# Patient Record
Sex: MALE
[De-identification: names, ages, dates, MRNs, and addresses within clinical notes are randomized per-mention and may not be internally consistent; named-entity substitution may affect disease eponyms.]

---

## 2018-09-12 ENCOUNTER — HOSPITAL (OUTPATIENT)
Dept: OTHER | Age: 48
End: 2018-09-12
Attending: GENERAL PRACTICE

## 2023-05-04 ENCOUNTER — OFFICE VISIT (OUTPATIENT)
Dept: INTERNAL MEDICINE CLINIC | Facility: CLINIC | Age: 53
End: 2023-05-04
Payer: COMMERCIAL

## 2023-05-04 ENCOUNTER — LAB ENCOUNTER (OUTPATIENT)
Dept: LAB | Age: 53
End: 2023-05-04
Attending: INTERNAL MEDICINE
Payer: COMMERCIAL

## 2023-05-04 VITALS
HEIGHT: 71.22 IN | TEMPERATURE: 97 F | DIASTOLIC BLOOD PRESSURE: 60 MMHG | OXYGEN SATURATION: 96 % | WEIGHT: 222.81 LBS | BODY MASS INDEX: 30.85 KG/M2 | SYSTOLIC BLOOD PRESSURE: 102 MMHG | HEART RATE: 96 BPM

## 2023-05-04 DIAGNOSIS — Z12.11 COLON CANCER SCREENING: ICD-10-CM

## 2023-05-04 DIAGNOSIS — Z00.00 ANNUAL PHYSICAL EXAM: ICD-10-CM

## 2023-05-04 DIAGNOSIS — L40.50 ARTHROPATHIC PSORIASIS (HCC): ICD-10-CM

## 2023-05-04 DIAGNOSIS — Z23 IMMUNIZATION DUE: ICD-10-CM

## 2023-05-04 DIAGNOSIS — L40.9 PSORIASIS OF SCALP: ICD-10-CM

## 2023-05-04 DIAGNOSIS — Z80.42 FAMILY HISTORY OF PROSTATE CANCER IN FATHER: ICD-10-CM

## 2023-05-04 DIAGNOSIS — Z00.00 ANNUAL PHYSICAL EXAM: Primary | ICD-10-CM

## 2023-05-04 LAB
ALBUMIN SERPL-MCNC: 4 G/DL (ref 3.4–5)
ALBUMIN/GLOB SERPL: 1.1 {RATIO} (ref 1–2)
ALP LIVER SERPL-CCNC: 111 U/L
ALT SERPL-CCNC: 48 U/L
ANION GAP SERPL CALC-SCNC: 1 MMOL/L (ref 0–18)
AST SERPL-CCNC: 25 U/L (ref 15–37)
BASOPHILS # BLD AUTO: 0.07 X10(3) UL (ref 0–0.2)
BASOPHILS NFR BLD AUTO: 1 %
BILIRUB SERPL-MCNC: 0.8 MG/DL (ref 0.1–2)
BUN BLD-MCNC: 13 MG/DL (ref 7–18)
CALCIUM BLD-MCNC: 9.2 MG/DL (ref 8.5–10.1)
CHLORIDE SERPL-SCNC: 109 MMOL/L (ref 98–112)
CHOLEST SERPL-MCNC: 187 MG/DL (ref ?–200)
CO2 SERPL-SCNC: 30 MMOL/L (ref 21–32)
COMPLEXED PSA SERPL-MCNC: 0.84 NG/ML (ref ?–4)
CREAT BLD-MCNC: 1.25 MG/DL
EOSINOPHIL # BLD AUTO: 0.14 X10(3) UL (ref 0–0.7)
EOSINOPHIL NFR BLD AUTO: 1.9 %
ERYTHROCYTE [DISTWIDTH] IN BLOOD BY AUTOMATED COUNT: 12.3 %
EST. AVERAGE GLUCOSE BLD GHB EST-MCNC: 120 MG/DL (ref 68–126)
FASTING PATIENT LIPID ANSWER: YES
FASTING STATUS PATIENT QL REPORTED: YES
GFR SERPLBLD BASED ON 1.73 SQ M-ARVRAT: 69 ML/MIN/1.73M2 (ref 60–?)
GLOBULIN PLAS-MCNC: 3.8 G/DL (ref 2.8–4.4)
GLUCOSE BLD-MCNC: 92 MG/DL (ref 70–99)
HBA1C MFR BLD: 5.8 % (ref ?–5.7)
HCT VFR BLD AUTO: 51.2 %
HDLC SERPL-MCNC: 41 MG/DL (ref 40–59)
HGB BLD-MCNC: 16.7 G/DL
IMM GRANULOCYTES # BLD AUTO: 0.03 X10(3) UL (ref 0–1)
IMM GRANULOCYTES NFR BLD: 0.4 %
LDLC SERPL CALC-MCNC: 109 MG/DL (ref ?–100)
LYMPHOCYTES # BLD AUTO: 2.01 X10(3) UL (ref 1–4)
LYMPHOCYTES NFR BLD AUTO: 27.5 %
MCH RBC QN AUTO: 29.6 PG (ref 26–34)
MCHC RBC AUTO-ENTMCNC: 32.6 G/DL (ref 31–37)
MCV RBC AUTO: 90.6 FL
MONOCYTES # BLD AUTO: 0.69 X10(3) UL (ref 0.1–1)
MONOCYTES NFR BLD AUTO: 9.4 %
NEUTROPHILS # BLD AUTO: 4.37 X10 (3) UL (ref 1.5–7.7)
NEUTROPHILS # BLD AUTO: 4.37 X10(3) UL (ref 1.5–7.7)
NEUTROPHILS NFR BLD AUTO: 59.8 %
NONHDLC SERPL-MCNC: 146 MG/DL (ref ?–130)
OSMOLALITY SERPL CALC.SUM OF ELEC: 290 MOSM/KG (ref 275–295)
PLATELET # BLD AUTO: 248 10(3)UL (ref 150–450)
POTASSIUM SERPL-SCNC: 4.5 MMOL/L (ref 3.5–5.1)
PROT SERPL-MCNC: 7.8 G/DL (ref 6.4–8.2)
RBC # BLD AUTO: 5.65 X10(6)UL
SODIUM SERPL-SCNC: 140 MMOL/L (ref 136–145)
TRIGL SERPL-MCNC: 212 MG/DL (ref 30–149)
TSI SER-ACNC: 2.39 MIU/ML (ref 0.36–3.74)
VIT D+METAB SERPL-MCNC: 29.5 NG/ML (ref 30–100)
VLDLC SERPL CALC-MCNC: 36 MG/DL (ref 0–30)
WBC # BLD AUTO: 7.3 X10(3) UL (ref 4–11)

## 2023-05-04 PROCEDURE — 90715 TDAP VACCINE 7 YRS/> IM: CPT | Performed by: INTERNAL MEDICINE

## 2023-05-04 PROCEDURE — 90472 IMMUNIZATION ADMIN EACH ADD: CPT | Performed by: INTERNAL MEDICINE

## 2023-05-04 PROCEDURE — 90750 HZV VACC RECOMBINANT IM: CPT | Performed by: INTERNAL MEDICINE

## 2023-05-04 PROCEDURE — 3008F BODY MASS INDEX DOCD: CPT | Performed by: INTERNAL MEDICINE

## 2023-05-04 PROCEDURE — 90471 IMMUNIZATION ADMIN: CPT | Performed by: INTERNAL MEDICINE

## 2023-05-04 PROCEDURE — 80053 COMPREHEN METABOLIC PANEL: CPT

## 2023-05-04 PROCEDURE — 82306 VITAMIN D 25 HYDROXY: CPT

## 2023-05-04 PROCEDURE — 3074F SYST BP LT 130 MM HG: CPT | Performed by: INTERNAL MEDICINE

## 2023-05-04 PROCEDURE — 85025 COMPLETE CBC W/AUTO DIFF WBC: CPT

## 2023-05-04 PROCEDURE — 84443 ASSAY THYROID STIM HORMONE: CPT

## 2023-05-04 PROCEDURE — 80061 LIPID PANEL: CPT

## 2023-05-04 PROCEDURE — 3078F DIAST BP <80 MM HG: CPT | Performed by: INTERNAL MEDICINE

## 2023-05-04 PROCEDURE — 36415 COLL VENOUS BLD VENIPUNCTURE: CPT

## 2023-05-04 PROCEDURE — 83036 HEMOGLOBIN GLYCOSYLATED A1C: CPT

## 2023-05-04 PROCEDURE — 99386 PREV VISIT NEW AGE 40-64: CPT | Performed by: INTERNAL MEDICINE

## 2023-05-04 PROCEDURE — 99203 OFFICE O/P NEW LOW 30 MIN: CPT | Performed by: INTERNAL MEDICINE

## 2023-05-04 RX ORDER — CLOBETASOL PROPIONATE 0.05 G/100ML
1 SHAMPOO TOPICAL 2 TIMES DAILY
Qty: 118 ML | Refills: 0 | Status: SHIPPED | OUTPATIENT
Start: 2023-05-04 | End: 2023-05-18

## 2023-08-10 ENCOUNTER — E-VISIT (OUTPATIENT)
Dept: TELEHEALTH | Age: 53
End: 2023-08-10

## 2023-08-10 DIAGNOSIS — L40.9 PSORIASIS OF SCALP: ICD-10-CM

## 2023-08-10 DIAGNOSIS — H01.001 BLEPHARITIS OF RIGHT UPPER EYELID, UNSPECIFIED TYPE: Primary | ICD-10-CM

## 2023-08-10 PROCEDURE — 99422 OL DIG E/M SVC 11-20 MIN: CPT | Performed by: PHYSICIAN ASSISTANT

## 2023-08-10 RX ORDER — ERYTHROMYCIN 5 MG/G
1 OINTMENT OPHTHALMIC 4 TIMES DAILY
Qty: 3.5 G | Refills: 0 | Status: SHIPPED | OUTPATIENT
Start: 2023-08-10

## 2023-08-10 RX ORDER — CLOBETASOL PROPIONATE 0.46 MG/ML
SOLUTION TOPICAL
Qty: 50 ML | Refills: 0 | Status: SHIPPED | OUTPATIENT
Start: 2023-08-10

## 2023-08-10 NOTE — PROGRESS NOTES
Tiffany Huang is a 46year old male who initiated e-visit care today. HPI:   See answers to questionnaire submission     No current outpatient medications on file. Past Medical History:   Diagnosis Date    Arthritis     Hyperlipidemia       Past Surgical History:   Procedure Laterality Date    COLONOSCOPY      HEMORRHOIDECTOMY      VASECTOMY        Family History   Problem Relation Age of Onset    Cancer Father     Stroke Father     Diabetes Sister       Social History:  Social History     Socioeconomic History    Marital status:    Tobacco Use    Smoking status: Never    Smokeless tobacco: Never   Vaping Use    Vaping Use: Never used   Substance and Sexual Activity    Alcohol use: Never    Drug use: Never   Other Topics Concern    Caffeine Concern No    Exercise No    Special Diet Yes    Stress Concern Yes    Weight Concern Yes         ASSESSMENT AND PLAN:       Diagnoses and all orders for this visit:    Blepharitis of right upper eyelid, unspecified type  -     erythromycin 5 MG/GM Ophthalmic Ointment; Apply 1 Application to eye 4 (four) times daily. Psoriasis of scalp  -     clobetasol 0.05 % External Solution; Apply 1mL to affected area(s) twice daily for 14 days           Duration of  the service:  14 minutes      See [facility] message exchange and Patient Instructions for Comfort Care and patient education.

## 2024-03-08 ENCOUNTER — E-VISIT (OUTPATIENT)
Dept: TELEHEALTH | Age: 54
End: 2024-03-08
Payer: COMMERCIAL

## 2024-03-08 DIAGNOSIS — B34.9 VIRAL SYNDROME: Primary | ICD-10-CM

## 2024-03-08 RX ORDER — GUSELKUMAB 100 MG/ML
INJECTION SUBCUTANEOUS
COMMUNITY
Start: 2023-10-18

## 2024-03-08 NOTE — PROGRESS NOTES
Pelon Whitaker is a 53 year old male submitting e-visit for congestion and cough.  HPI:   See answers to questionnaire and [employer] message exchange  Pt has psoriatic arthritis; received Tremfya  Exposed to son with COVID; pt tested neg for COVID 3/7/24    Current Outpatient Medications   Medication Sig Dispense Refill    TREMFYA 100 MG/ML Subcutaneous Solution Pen-injector Inject 1 pen (100 mg/mL) under the skin every 8 weeks      erythromycin 5 MG/GM Ophthalmic Ointment Apply 1 Application to eye 4 (four) times daily. 3.5 g 0    clobetasol 0.05 % External Solution Apply 1mL to affected area(s) twice daily for 14 days 50 mL 0      Past Medical History:   Diagnosis Date    Arthritis     Hyperlipidemia       Past Surgical History:   Procedure Laterality Date    COLONOSCOPY      HEMORRHOIDECTOMY      VASECTOMY        Family History   Problem Relation Age of Onset    Cancer Father     Stroke Father     Diabetes Sister       Social History:  Social History     Socioeconomic History    Marital status:    Tobacco Use    Smoking status: Never    Smokeless tobacco: Never   Vaping Use    Vaping Use: Never used   Substance and Sexual Activity    Alcohol use: Never    Drug use: Never   Other Topics Concern    Caffeine Concern No    Exercise No    Special Diet Yes    Stress Concern Yes    Weight Concern Yes         ASSESSMENT AND PLAN:       Diagnoses and all orders for this visit:    Viral syndrome    After reviewing questionnaire, a higher level of care was recommended to pt d/t limitations of telehealth.   Pt is immunocompromised.  Exposed to son with COVID but pt tested negative on home test  Referred to IC for further eval,  Recommend retest for COVID and treatment with Paxlovid if pt is positive.   Refer to MyChart message exchange for specific patient instructions          Duration of  the service:  10 minutes